# Patient Record
Sex: MALE | Race: WHITE | NOT HISPANIC OR LATINO | Employment: OTHER | URBAN - METROPOLITAN AREA
[De-identification: names, ages, dates, MRNs, and addresses within clinical notes are randomized per-mention and may not be internally consistent; named-entity substitution may affect disease eponyms.]

---

## 2017-07-17 NOTE — PATIENT DISCUSSION
CATARACTS, OU- QUESTIONABLY VISUALLY SIGNIFICANT. PT TO TREAT LUIS/MGD 1ST THEN CONSIDER SURGERY IF VISUAL SYMPTOMS PERSIST. FOLLOW.

## 2017-07-17 NOTE — PATIENT DISCUSSION
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 TREAT MGD FIRST THEN CONSIDER OTHER TREATMENT OPTIONS IF SYMPTOMS PERSIST.

## 2018-06-20 ENCOUNTER — PREPPED CHART (OUTPATIENT)
Dept: URBAN - METROPOLITAN AREA CLINIC 39 | Facility: CLINIC | Age: 78
End: 2018-06-20

## 2018-06-20 ENCOUNTER — CONSULT (OUTPATIENT)
Dept: URBAN - METROPOLITAN AREA CLINIC 39 | Facility: CLINIC | Age: 78
End: 2018-06-20

## 2018-06-20 DIAGNOSIS — L98.8: ICD-10-CM

## 2018-06-20 PROCEDURE — 99499 UNLISTED E&M SERVICE: CPT

## 2018-06-20 ASSESSMENT — VISUAL ACUITY
OD_SC: 20/25-1
OS_SC: 20/40-1

## 2019-06-03 NOTE — PATIENT DISCUSSION
GLAUCOMA SUSPECT OU, HISTORY OF PI OU. OPTIC NERVE CUPPING 0.5 OU. WILL GET LAST VISUAL FIELD FROM DR. HENSON

## 2019-06-03 NOTE — PATIENT DISCUSSION
**For patient's undergoing cataract surgery. In the event you decline a refractive package, traditional cataract surgery will be performed and a monofocal IOL will be implanted to target distance vision (or plano).

## 2019-06-03 NOTE — PATIENT DISCUSSION
Surgery  Counseling: I have discussed the option of glasses versus cataract surgery versus following. It was explained that when vision no longer meets the patient's visual needs and a new prescription for glasses is not likely to improve the patient's visual symptoms, the option of cataract surgery is a reasonable next step. It was explained that there is no guarantee that removing the cataract will improve their visual symptoms, however; it is believed that the cataract is contributing to the patient's visual impairment and surgery may significantly improve both the visual and functional status of the patient. The risks, benefits and alternatives of surgery were discussed with the patient. After this discussion, the patient desires to proceed with cataract surgery with implantation of an intraocular lens to improve vision and reduce glare .

## 2019-06-03 NOTE — PATIENT DISCUSSION
PATIENT MAY QUALIFY FOR EDOF/MF RESEARCH STUDY AND AFTER DISCUSSION WOULD LIKE TO RECEIVE MORE INFORMATION BEFORE DECIDING.

## 2019-06-13 NOTE — PATIENT DISCUSSION
***This patient had traditional cataract surgery performed. A monofocal  IOL was placed to achieve a target refraction of _plano (which should provide them with satisfactory distance vision). ***

## 2019-06-13 NOTE — PATIENT DISCUSSION
Post-Op Instructions: The patient was instructed in the proper use of post-operative eye drops: pred-gati in the surgical eye 3 times per day for 2 weeks, then 2 times per day for 1 week, then 1 time per day for 1 week, then discontinue. Recommended to the patient to continue PRN vitamins for 90 days or until their 90 days supply is gone. Call back instructions, retinal detachment and endophthalmitis precautions given.

## 2019-06-13 NOTE — PATIENT DISCUSSION
Surgery  Counseling: I have discussed the option of glasses versus cataract surgery versus following . It was explained that when vision no longer meets the patient's visual needs and a new prescription for glasses is not likely to improve all of the patient's visual symptoms, the option of cataract surgery is a reasonable next step. It was explained that there is no guarantee that removing the cataract will improve their visual symptoms, however; it is believed that the cataract is contributing to the patient's visual impairment and surgery may significantly improve both the visual and functional status of the patient. The risks, benefits and alternatives of surgery were discussed with the patient.   After this discussion, the patient desires to proceed with cataract surgery with implantation of an intraocular lens to improve vision for night driving

## 2019-06-25 ENCOUNTER — ESTABLISHED COMPREHENSIVE EXAM (OUTPATIENT)
Dept: URBAN - METROPOLITAN AREA CLINIC 39 | Facility: CLINIC | Age: 79
End: 2019-06-25

## 2019-06-25 VITALS
SYSTOLIC BLOOD PRESSURE: 116 MMHG | DIASTOLIC BLOOD PRESSURE: 52 MMHG | HEIGHT: 60 IN | RESPIRATION RATE: 14 BRPM | HEART RATE: 55 BPM

## 2019-06-25 DIAGNOSIS — H43.813: ICD-10-CM

## 2019-06-25 DIAGNOSIS — H04.123: ICD-10-CM

## 2019-06-25 DIAGNOSIS — Z96.1: ICD-10-CM

## 2019-06-25 DIAGNOSIS — H35.373: ICD-10-CM

## 2019-06-25 PROCEDURE — 92014 COMPRE OPH EXAM EST PT 1/>: CPT

## 2019-06-25 PROCEDURE — 92015 DETERMINE REFRACTIVE STATE: CPT

## 2019-06-25 PROCEDURE — 92134 CPTRZ OPH DX IMG PST SGM RTA: CPT

## 2019-06-25 ASSESSMENT — TONOMETRY
OD_IOP_MMHG: 20
OS_IOP_MMHG: 16

## 2019-06-25 ASSESSMENT — VISUAL ACUITY
OD_SC: J1 @ 5"
OD_SC: 20/30-1
OS_SC: 20/30-2
OS_SC: J1 @ 5"

## 2021-05-04 ENCOUNTER — ESTABLISHED COMPREHENSIVE EXAM (OUTPATIENT)
Dept: URBAN - METROPOLITAN AREA CLINIC 39 | Facility: CLINIC | Age: 81
End: 2021-05-04

## 2021-05-04 DIAGNOSIS — L98.8: ICD-10-CM

## 2021-05-04 DIAGNOSIS — H35.373: ICD-10-CM

## 2021-05-04 DIAGNOSIS — H04.123: ICD-10-CM

## 2021-05-04 DIAGNOSIS — Z96.1: ICD-10-CM

## 2021-05-04 DIAGNOSIS — H43.813: ICD-10-CM

## 2021-05-04 DIAGNOSIS — Z98.890: ICD-10-CM

## 2021-05-04 PROCEDURE — 92250 FUNDUS PHOTOGRAPHY W/I&R: CPT

## 2021-05-04 PROCEDURE — 92134 CPTRZ OPH DX IMG PST SGM RTA: CPT

## 2021-05-04 PROCEDURE — 92015 DETERMINE REFRACTIVE STATE: CPT

## 2021-05-04 PROCEDURE — 92014 COMPRE OPH EXAM EST PT 1/>: CPT

## 2021-05-04 ASSESSMENT — TONOMETRY
OS_IOP_MMHG: 16
OD_IOP_MMHG: 18

## 2021-05-04 ASSESSMENT — VISUAL ACUITY
OD_SC: 20/30-1
OS_SC: 20/40+2
OU_SC: J1+
OD_SC: J1+
OS_SC: J1
OU_SC: 20/25-1

## 2022-05-04 NOTE — PATIENT DISCUSSION
Patient has tried restasis and [place] and neither were helpful.  At this time she uses PF ATs prn.  We discussed her lids and we discussed the fact that she has RA and DM which are likely exacerbating this chronic condition.  Got her some samples of PF ATs in office today.

## 2022-05-04 NOTE — PATIENT DISCUSSION
Patient has anatomical narrow angles.  She had an LPI OU in 2008.  IOPs are consistently WNL.  ONH OCT progression analysis stable back to 2014 OD, OS.  Continue to monitor going forward via Anurag Palma 74 OCT to assess for changes but stable at this point.

## 2022-05-04 NOTE — PATIENT DISCUSSION
Uncorrected VAs and today's manifest stable relative to 2020 exam.  Patient is content with uncorrected distance acuity.

## 2022-08-17 ENCOUNTER — COMPREHENSIVE EXAM (OUTPATIENT)
Dept: URBAN - METROPOLITAN AREA CLINIC 39 | Facility: CLINIC | Age: 82
End: 2022-08-17

## 2022-08-17 DIAGNOSIS — H02.831: ICD-10-CM

## 2022-08-17 DIAGNOSIS — H02.835: ICD-10-CM

## 2022-08-17 DIAGNOSIS — H02.832: ICD-10-CM

## 2022-08-17 DIAGNOSIS — H04.123: ICD-10-CM

## 2022-08-17 DIAGNOSIS — H52.203: ICD-10-CM

## 2022-08-17 DIAGNOSIS — H35.363: ICD-10-CM

## 2022-08-17 DIAGNOSIS — H02.834: ICD-10-CM

## 2022-08-17 DIAGNOSIS — L98.8: ICD-10-CM

## 2022-08-17 DIAGNOSIS — H35.373: ICD-10-CM

## 2022-08-17 DIAGNOSIS — H52.11: ICD-10-CM

## 2022-08-17 DIAGNOSIS — H43.813: ICD-10-CM

## 2022-08-17 PROCEDURE — 92015 DETERMINE REFRACTIVE STATE: CPT

## 2022-08-17 PROCEDURE — 92134 CPTRZ OPH DX IMG PST SGM RTA: CPT

## 2022-08-17 PROCEDURE — 92014 COMPRE OPH EXAM EST PT 1/>: CPT

## 2022-08-17 ASSESSMENT — VISUAL ACUITY
OS_SC: 20/40+2
OD_SC: 20/30
OS_SC: J1
OD_SC: J1

## 2022-08-17 ASSESSMENT — TONOMETRY
OD_IOP_MMHG: 19
OS_IOP_MMHG: 17

## 2023-08-18 ENCOUNTER — COMPREHENSIVE EXAM (OUTPATIENT)
Dept: URBAN - METROPOLITAN AREA CLINIC 39 | Facility: CLINIC | Age: 83
End: 2023-08-18

## 2023-08-18 DIAGNOSIS — H02.831: ICD-10-CM

## 2023-08-18 DIAGNOSIS — H43.813: ICD-10-CM

## 2023-08-18 DIAGNOSIS — H02.834: ICD-10-CM

## 2023-08-18 DIAGNOSIS — H40.023: ICD-10-CM

## 2023-08-18 DIAGNOSIS — H02.835: ICD-10-CM

## 2023-08-18 DIAGNOSIS — H35.373: ICD-10-CM

## 2023-08-18 DIAGNOSIS — H52.203: ICD-10-CM

## 2023-08-18 DIAGNOSIS — H02.832: ICD-10-CM

## 2023-08-18 DIAGNOSIS — H52.11: ICD-10-CM

## 2023-08-18 DIAGNOSIS — H04.123: ICD-10-CM

## 2023-08-18 DIAGNOSIS — H35.363: ICD-10-CM

## 2023-08-18 PROCEDURE — 92014 COMPRE OPH EXAM EST PT 1/>: CPT

## 2023-08-18 PROCEDURE — 92250 FUNDUS PHOTOGRAPHY W/I&R: CPT

## 2023-08-18 PROCEDURE — 92015 DETERMINE REFRACTIVE STATE: CPT

## 2023-08-18 ASSESSMENT — VISUAL ACUITY
OD_SC: 20/30-2
OU_SC: J1
OS_SC: J2
OS_SC: 20/40-2
OD_SC: J1
OU_SC: 20/20-2

## 2023-08-18 ASSESSMENT — TONOMETRY
OD_IOP_MMHG: 18
OS_IOP_MMHG: 17

## 2023-10-04 ENCOUNTER — FOLLOW UP (OUTPATIENT)
Dept: URBAN - METROPOLITAN AREA CLINIC 39 | Facility: CLINIC | Age: 83
End: 2023-10-04

## 2023-10-04 DIAGNOSIS — H40.023: ICD-10-CM

## 2023-10-04 PROCEDURE — 92012 INTRM OPH EXAM EST PATIENT: CPT

## 2023-10-04 PROCEDURE — 76514 ECHO EXAM OF EYE THICKNESS: CPT | Mod: 25

## 2023-10-04 PROCEDURE — 92083 EXTENDED VISUAL FIELD XM: CPT | Mod: 25

## 2023-10-04 ASSESSMENT — VISUAL ACUITY
OD_SC: 20/30-2
OS_SC: 20/40
OU_SC: 20/30-2

## 2023-10-04 ASSESSMENT — PACHYMETRY
OS_CT_UM: 573
OD_CT_UM: 574

## 2023-10-04 ASSESSMENT — TONOMETRY
OD_IOP_MMHG: 18
OS_IOP_MMHG: 13

## 2024-03-05 ENCOUNTER — FOLLOW UP (OUTPATIENT)
Dept: URBAN - METROPOLITAN AREA CLINIC 39 | Facility: CLINIC | Age: 84
End: 2024-03-05

## 2024-03-05 DIAGNOSIS — H40.023: ICD-10-CM

## 2024-03-05 PROCEDURE — 92012 INTRM OPH EXAM EST PATIENT: CPT

## 2024-03-05 RX ORDER — BRIMONIDINE TARTRATE 2 MG/MG
1 SOLUTION/ DROPS OPHTHALMIC TWICE A DAY
Start: 2024-03-05

## 2024-03-05 ASSESSMENT — TONOMETRY
OD_IOP_MMHG: 26
OS_IOP_MMHG: 20
OS_IOP_MMHG: 18
OD_IOP_MMHG: 23

## 2024-03-05 ASSESSMENT — VISUAL ACUITY
OS_SC: 20/50+1
OD_SC: 20/30+1

## 2024-04-03 ENCOUNTER — FOLLOW UP (OUTPATIENT)
Dept: URBAN - METROPOLITAN AREA CLINIC 39 | Facility: CLINIC | Age: 84
End: 2024-04-03

## 2024-04-03 DIAGNOSIS — H01.023: ICD-10-CM

## 2024-04-03 DIAGNOSIS — H40.023: ICD-10-CM

## 2024-04-03 DIAGNOSIS — H01.026: ICD-10-CM

## 2024-04-03 PROCEDURE — 92012 INTRM OPH EXAM EST PATIENT: CPT

## 2024-04-03 ASSESSMENT — VISUAL ACUITY
OS_SC: 20/30
OD_SC: 20/40

## 2024-04-03 ASSESSMENT — TONOMETRY
OS_IOP_MMHG: 15
OD_IOP_MMHG: 19

## 2024-08-07 ENCOUNTER — FOLLOW UP (OUTPATIENT)
Dept: URBAN - METROPOLITAN AREA CLINIC 39 | Facility: CLINIC | Age: 84
End: 2024-08-07

## 2024-08-07 DIAGNOSIS — H40.1131: ICD-10-CM

## 2024-08-07 DIAGNOSIS — H40.023: ICD-10-CM

## 2024-08-07 PROCEDURE — 92012 INTRM OPH EXAM EST PATIENT: CPT | Mod: 25

## 2024-08-07 PROCEDURE — 92083 EXTENDED VISUAL FIELD XM: CPT | Mod: 25

## 2024-08-07 ASSESSMENT — TONOMETRY
OD_IOP_MMHG: 14
OS_IOP_MMHG: 14

## 2024-08-07 ASSESSMENT — VISUAL ACUITY
OD_SC: 20/25
OU_SC: 20/30
OS_SC: 20/30-1

## 2025-01-09 ENCOUNTER — COMPREHENSIVE EXAM (OUTPATIENT)
Age: 85
End: 2025-01-09

## 2025-01-09 DIAGNOSIS — H02.834: ICD-10-CM

## 2025-01-09 DIAGNOSIS — H52.203: ICD-10-CM

## 2025-01-09 DIAGNOSIS — H35.373: ICD-10-CM

## 2025-01-09 DIAGNOSIS — H02.832: ICD-10-CM

## 2025-01-09 DIAGNOSIS — H02.835: ICD-10-CM

## 2025-01-09 DIAGNOSIS — H01.026: ICD-10-CM

## 2025-01-09 DIAGNOSIS — H02.831: ICD-10-CM

## 2025-01-09 DIAGNOSIS — H16.223: ICD-10-CM

## 2025-01-09 DIAGNOSIS — H43.813: ICD-10-CM

## 2025-01-09 DIAGNOSIS — H40.1131: ICD-10-CM

## 2025-01-09 DIAGNOSIS — H35.363: ICD-10-CM

## 2025-01-09 DIAGNOSIS — H01.023: ICD-10-CM

## 2025-01-09 DIAGNOSIS — H52.11: ICD-10-CM

## 2025-01-09 PROCEDURE — 92014 COMPRE OPH EXAM EST PT 1/>: CPT

## 2025-01-09 PROCEDURE — 92133 CPTRZD OPH DX IMG PST SGM ON: CPT | Mod: 25

## 2025-01-09 PROCEDURE — 92015 DETERMINE REFRACTIVE STATE: CPT

## 2025-01-09 RX ORDER — LATANOPROST 50 UG/ML
1 SOLUTION/ DROPS OPHTHALMIC EVERY EVENING
Start: 2025-01-09

## 2025-01-09 RX ORDER — DORZOLAMIDE 20 MG/ML
1 SOLUTION/ DROPS OPHTHALMIC TWICE A DAY
Start: 2025-01-09

## 2025-01-29 NOTE — PATIENT DISCUSSION
REFRACTIVE ERROR, OU - DISCUSSED OPTION OF CORRECTING AT THE TIME OF CATARACT SURGERY. PT UNDERSTANDS AND ELECTS TO CONSIDER THEIR OPTIONS. Refill Routing Note   Medication(s) are not appropriate for processing by [facility] for the following reason(s):        Non-participating provider    [facility] action(s):  Route               Appointments  past 12m or future 3m with PCP    Date Provider   Last Visit   9/18/2024 Rosanna Huizar MD   Next Visit   4/8/2025 Rosanna uHizar MD   ED visits in past 90 days: 0        Note composed:8:47 AM 01/29/2025

## 2025-02-06 ENCOUNTER — FOLLOW UP (OUTPATIENT)
Age: 85
End: 2025-02-06

## 2025-02-06 DIAGNOSIS — H40.1131: ICD-10-CM

## 2025-02-06 PROCEDURE — 92012 INTRM OPH EXAM EST PATIENT: CPT

## 2025-04-04 ENCOUNTER — FOLLOW UP (OUTPATIENT)
Age: 85
End: 2025-04-04

## 2025-04-04 DIAGNOSIS — H40.1131: ICD-10-CM

## 2025-04-04 PROCEDURE — 92012 INTRM OPH EXAM EST PATIENT: CPT

## 2025-07-24 ENCOUNTER — FOLLOW UP (OUTPATIENT)
Age: 85
End: 2025-07-24

## 2025-07-24 DIAGNOSIS — H40.1131: ICD-10-CM

## 2025-07-24 PROCEDURE — 92083 EXTENDED VISUAL FIELD XM: CPT | Mod: 25

## 2025-07-24 PROCEDURE — 92012 INTRM OPH EXAM EST PATIENT: CPT
